# Patient Record
Sex: FEMALE | Race: WHITE | NOT HISPANIC OR LATINO | Employment: OTHER | URBAN - METROPOLITAN AREA
[De-identification: names, ages, dates, MRNs, and addresses within clinical notes are randomized per-mention and may not be internally consistent; named-entity substitution may affect disease eponyms.]

---

## 2019-10-07 ENCOUNTER — HOSPITAL ENCOUNTER (OUTPATIENT)
Dept: RADIOLOGY | Facility: HOSPITAL | Age: 44
Discharge: HOME | End: 2019-10-07
Attending: OBSTETRICS & GYNECOLOGY | Admitting: RADIOLOGY
Payer: COMMERCIAL

## 2019-10-07 VITALS — SYSTOLIC BLOOD PRESSURE: 143 MMHG | DIASTOLIC BLOOD PRESSURE: 84 MMHG

## 2019-10-07 DIAGNOSIS — Z31.41 ENCOUNTER FOR FERTILITY TESTING: ICD-10-CM

## 2019-10-07 DIAGNOSIS — N97.8 ANTISPERM ANTIBODIES CAUSING INFERTILITY: ICD-10-CM

## 2019-10-07 PROCEDURE — BU121ZZ FLUOROSCOPY OF BILATERAL FALLOPIAN TUBES USING LOW OSMOLAR CONTRAST: ICD-10-PCS | Performed by: RADIOLOGY

## 2019-10-07 PROCEDURE — 36100345 HC PROCEDURE ROOM 45MIN

## 2019-10-07 PROCEDURE — 74740 X-RAY FEMALE GENITAL TRACT: CPT

## 2019-10-07 PROCEDURE — 63600105 HC IODINE BASED CONTRAST: Mod: JW | Performed by: OBSTETRICS & GYNECOLOGY

## 2019-10-07 RX ORDER — AZITHROMYCIN 500 MG/1
1000 TABLET, FILM COATED ORAL ONCE
COMMUNITY
Start: 2019-10-05

## 2019-10-07 RX ADMIN — IOHEXOL 7 ML: 300 INJECTION, SOLUTION INTRAVENOUS at 13:20

## 2019-10-07 NOTE — DISCHARGE INSTRUCTIONS
- You may resume normal activity as tolerated  - You may resume normal diet  - Vagina spotting is common following the procedure, don't be concerned if it happens.  - If you develop heavy bleeding (like a period) please call [facility].    [facility] strongly recommends that you visit a Primary Care Provider (PCP) regularly. Your PCP can help you implement the recommendations we gave you today, coordinate care among your specialists, as well as make sure you are up to date with wellness exams, immunizations and preventive screenings. Your PCP can also help when you are feeling sick, potentially avoiding the need for urgent care or emergency department visits. For these reasons, it is important that you follow up with your PCP at least annually or more often based upon your medical conditions. If you do not have a PCP, please call [phone] ([phone]) or go to Find a Doctor for help with finding one.

## 2019-10-07 NOTE — NURSING NOTE
HCG from [facility] - 9/30/2019   HC < 1.00 mlU/mL.   Took antibiotic as ordered.

## 2019-10-07 NOTE — H&P
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 female who presents with infertility.     Medical History: No past medical history on file.    Surgical History: No past surgical history on file.    Allergies: Patient has no known allergies.    No current facility-administered medications for this encounter.        Social History:   Social History     Social History   • Marital status:      Spouse name: N/A   • Number of children: N/A   • Years of education: N/A     Social History Main Topics   • Smoking status: Not on file   • Smokeless tobacco: Not on file   • Alcohol use Not on file   • Drug use: Unknown   • Sexual activity: Not on file     Other Topics Concern   • Not on file     Social History Narrative   • No narrative on file       Family History: No family history on file.    Review of Systems  All other systems reviewed and negative except as noted in the HPI.    Objective     Vital Signs for the last 24 hours:         General appearance: alert, appears stated age, no distress and cooperative  Neurologic: Alert and oriented X 3    Labs   I have reviewed the patient's pertinent labs. Pertinent labs are within normal limits.    Imaging  Not applicable    ECG/Telemetry  not applicable    Assessment/Plan     Continue with Hysterosalpingogram    VTE Assessment: Padua      Code Status: No Order  Estimated discharge date: 10/7/2019      MOLLY Santoro

## 2019-10-07 NOTE — POST-PROCEDURE NOTE
Interventional Radiology Brief Postprocedure Note    [facility]     Attending: Migel Henao MD    Assistant: MOLLY Santoro    Diagnosis: Infertility    Description of procedure: Hysterosalpingogram    Contrast: 7ml omni 300     Anesthesia:  None    Medications: none     Complications: None      Estimated Blood Loss: Estimated Blood Loss: None    Anticoagulation: resume all medication as prescribed by PCP    Specimens: none    Findings: normal hysterosalpingogram    10/7/2019 1:22 PM

## 2024-01-28 ENCOUNTER — OFFICE VISIT (OUTPATIENT)
Dept: URGENT CARE | Facility: CLINIC | Age: 49
End: 2024-01-28
Payer: COMMERCIAL

## 2024-01-28 VITALS
HEART RATE: 65 BPM | SYSTOLIC BLOOD PRESSURE: 147 MMHG | OXYGEN SATURATION: 97 % | DIASTOLIC BLOOD PRESSURE: 91 MMHG | RESPIRATION RATE: 18 BRPM | TEMPERATURE: 98.1 F

## 2024-01-28 DIAGNOSIS — J02.9 SORE THROAT: ICD-10-CM

## 2024-01-28 DIAGNOSIS — J06.9 VIRAL URI: Primary | ICD-10-CM

## 2024-01-28 LAB — S PYO DNA THROAT QL NAA+PROBE: NOT DETECTED

## 2024-01-28 PROCEDURE — 99213 OFFICE O/P EST LOW 20 MIN: CPT

## 2024-01-28 RX ORDER — DEXTROMETHORPHAN HYDROBROMIDE AND PROMETHAZINE HYDROCHLORIDE 15; 6.25 MG/5ML; MG/5ML
5 SYRUP ORAL 4 TIMES DAILY PRN
Qty: 180 ML | Refills: 0 | Status: SHIPPED | OUTPATIENT
Start: 2024-01-28

## 2024-01-28 NOTE — PATIENT INSTRUCTIONS
Rapid strep test negative.    Your symptoms are consistent with a viral illness.    For nasal/sinus congestion you can try steam, warm compresses, saline nasal spray, Neti pot, nasal steroid (Flonase, Nasocort), or nasal decongestant (Afrin - for 3 days only).    You can try a decongestant (Sudafed) if > 6 years of age and no history of high blood pressure.    For cough you can take an over-the-counter expectorant such as plain Robitussion or Mucinex. A spoonful of honey at bedtime may also be helpful.    For sore throat you can use Cepacol lozenges, do warm salt water gargles, drink warm water with lemon or herbal teas, or use an over-the-counter throat spray (Chloraseptic).    You can take ibuprofen/Motrin and acetaminophen/Tylenol as needed for pain, fever, body aches. Do not take ibuprofen/Motrin/Advil if you have a history of heart disease, bleeding ulcers, or if you take blood thinners.     Drink plenty of fluids to stay hydrated.    Follow up with your PCP in 5-7 days for persistent symptoms.    Go to the ER if symptoms worsen.

## 2024-01-28 NOTE — PROGRESS NOTES
[facility]        NAME: Ashley Briones is a 48 y.o. female  : 1975    MRN: [id number]  DATE: 2024  TIME: 2:07 PM    Assessment and Plan   Viral URI [J06.9]  1. Viral URI  promethazine-dextromethorphan (PHENERGAN-DM) 6.25-15 mg/5 mL oral syrup      2. Sore throat  POCT rapid PCR strepA            Patient Instructions     Rapid strep test negative.    Your symptoms are consistent with a viral illness.    For nasal/sinus congestion you can try steam, warm compresses, saline nasal spray, Neti pot, nasal steroid (Flonase, Nasocort), or nasal decongestant (Afrin - for 3 days only).    You can try a decongestant (Sudafed) if > 6 years of age and no history of high blood pressure.    For cough you can take an over-the-counter expectorant such as plain Robitussion or Mucinex. A spoonful of honey at bedtime may also be helpful.    For sore throat you can use Cepacol lozenges, do warm salt water gargles, drink warm water with lemon or herbal teas, or use an over-the-counter throat spray (Chloraseptic).    You can take ibuprofen/Motrin and acetaminophen/Tylenol as needed for pain, fever, body aches. Do not take ibuprofen/Motrin/Advil if you have a history of heart disease, bleeding ulcers, or if you take blood thinners.     Drink plenty of fluids to stay hydrated.    Follow up with your PCP in 5-7 days for persistent symptoms.    Go to the ER if symptoms worsen.        Chief Complaint     Chief Complaint   Patient presents with    Sore Throat     Pt reports sore throat since Tuesday. Pt recovered from having fevers and joint pain. Pt reports     Cough     Pt reports sore throat causes small dry cough.          History of Present Illness       This is a 48-year-old female presenting with a sore throat x5 days. Experienced subjective fevers, chills, and body aches at onset of illness which have since resolved. Now with PND and a cough which keep her up at night. Taking various OTC supplements  and drinking teas for relief.        Review of Systems   Review of Systems   Constitutional:  Positive for chills and fever (subjective, resolved).   HENT:  Positive for postnasal drip and sore throat. Negative for congestion, ear pain, rhinorrhea and sinus pressure.    Eyes:  Negative for discharge and redness.   Respiratory:  Positive for cough. Negative for shortness of breath and wheezing.    Cardiovascular:  Negative for chest pain and palpitations.   Gastrointestinal:  Negative for abdominal pain, diarrhea, nausea and vomiting.   Musculoskeletal:  Positive for arthralgias and myalgias.   Skin:  Negative for pallor and rash.   Neurological:  Negative for dizziness, light-headedness and headaches.         Current Medications       Current Outpatient Medications:     promethazine-dextromethorphan (PHENERGAN-DM) 6.25-15 mg/5 mL oral syrup, Take 5 mL by mouth 4 (four) times a day as needed for cough, Disp: 180 mL, Rfl: 0    Current Allergies     Allergies as of 01/28/2024 - Reviewed 01/28/2024   Allergen Reaction Noted    Nitrofurantoin Rash 01/28/2024            The following portions of the patient's history were reviewed and updated as appropriate: allergies, current medications, past family history, past medical history, past social history, past surgical history and problem list.     History reviewed. No pertinent past medical history.    History reviewed. No pertinent surgical history.    History reviewed. No pertinent family history.      Medications have been verified.        Objective   /91   Pulse 65   Temp 98.1 °F (36.7 °C) (Tympanic)   Resp 18   SpO2 97%        Physical Exam     Physical Exam  Vitals and nursing note reviewed.   Constitutional:       General: She is not in acute distress.     Appearance: She is not ill-appearing or diaphoretic.   HENT:      Head: Normocephalic.      Right Ear: Tympanic membrane, ear canal and external ear normal.      Left Ear: Tympanic membrane, ear canal and  external ear normal.      Nose: Nose normal.      Mouth/Throat:      Mouth: Mucous membranes are moist.      Pharynx: Posterior oropharyngeal erythema (mild) present.      Comments: PND  Eyes:      Conjunctiva/sclera: Conjunctivae normal.      Pupils: Pupils are equal, round, and reactive to light.   Cardiovascular:      Rate and Rhythm: Normal rate and regular rhythm.      Pulses: Normal pulses.      Heart sounds: Normal heart sounds.   Pulmonary:      Effort: Pulmonary effort is normal.      Breath sounds: Normal breath sounds.      Comments: Strong raspy cough noted.  Musculoskeletal:         General: Normal range of motion.      Cervical back: Normal range of motion and neck supple.   Lymphadenopathy:      Cervical: No cervical adenopathy.   Skin:     General: Skin is warm and dry.      Capillary Refill: Capillary refill takes less than 2 seconds.   Neurological:      Mental Status: She is alert and oriented to person, place, and time.

## 2024-06-26 ENCOUNTER — OFFICE VISIT (OUTPATIENT)
Dept: URGENT CARE | Facility: CLINIC | Age: 49
End: 2024-06-26
Payer: COMMERCIAL

## 2024-06-26 ENCOUNTER — APPOINTMENT (OUTPATIENT)
Dept: RADIOLOGY | Facility: CLINIC | Age: 49
End: 2024-06-26
Payer: COMMERCIAL

## 2024-06-26 VITALS
HEART RATE: 74 BPM | RESPIRATION RATE: 18 BRPM | DIASTOLIC BLOOD PRESSURE: 102 MMHG | OXYGEN SATURATION: 98 % | SYSTOLIC BLOOD PRESSURE: 175 MMHG

## 2024-06-26 DIAGNOSIS — M25.512 ACUTE PAIN OF LEFT SHOULDER: Primary | ICD-10-CM

## 2024-06-26 DIAGNOSIS — M75.02 ADHESIVE CAPSULITIS OF LEFT SHOULDER: ICD-10-CM

## 2024-06-26 PROCEDURE — 99213 OFFICE O/P EST LOW 20 MIN: CPT | Performed by: FAMILY MEDICINE

## 2024-06-26 PROCEDURE — 73030 X-RAY EXAM OF SHOULDER: CPT

## 2024-06-26 NOTE — PROGRESS NOTES
[facility]        NAME: Ashley Briones is a 48 y.o. female  : 1975    MRN: [id number]  DATE: 2024  TIME: 1:56 PM    Assessment and Plan   Acute pain of left shoulder [M25.512]  1. Acute pain of left shoulder  XR shoulder 2+ vw left      2. Adhesive capsulitis of left shoulder              Patient Instructions       Follow up with PCP in 3-5 days.  Proceed to  ER if symptoms worsen.    If tests have been performed at [facility], our office will contact you with results if changes need to be made to the care plan discussed with you at the visit.  [facility]    Chief Complaint     Chief Complaint   Patient presents with    Shoulder Pain     Patient has left shoulder pain that started on Monday evening. States the only thing she did that could have aggravated it was a heavier arm workout at the gym on           History of Present Illness       48-year-old female presenting for evaluation of left shoulder pain.  She reports began with pain 3 days ago after doing an intense exercise program at the gym while weight training.  She states that she is unable to move her arm above her head or out to the side.  Ibuprofen has provided minimal relief.  Denies any injuries or trauma.  Denies any joint redness, warmth or swelling.    Shoulder Pain         Review of Systems   Review of Systems   Constitutional: Negative.    HENT: Negative.     Eyes: Negative.    Respiratory: Negative.     Cardiovascular: Negative.    Gastrointestinal: Negative.    Genitourinary: Negative.    Musculoskeletal:  Positive for arthralgias and myalgias.   Skin: Negative.    Allergic/Immunologic: Negative.    Neurological: Negative.    Hematological: Negative.    Psychiatric/Behavioral: Negative.           Current Medications       Current Outpatient Medications:     promethazine-dextromethorphan (PHENERGAN-DM) 6.25-15 mg/5 mL oral syrup, Take 5 mL by mouth 4 (four) times a  day as needed for cough, Disp: 180 mL, Rfl: 0    Current Allergies     Allergies as of 06/26/2024 - Reviewed 01/28/2024   Allergen Reaction Noted    Nitrofurantoin Rash 01/28/2024            The following portions of the patient's history were reviewed and updated as appropriate: allergies, current medications, past family history, past medical history, past social history, past surgical history and problem list.     No past medical history on file.    No past surgical history on file.    No family history on file.      Medications have been verified.        Objective   BP (!) 175/102   Pulse 74   Resp 18   SpO2 98%   No LMP recorded.       Physical Exam     Physical Exam  Vitals and nursing note reviewed.   Constitutional:       Appearance: She is well-developed.   HENT:      Head: Normocephalic.      Nose: Nose normal.   Eyes:      Pupils: Pupils are equal, round, and reactive to light.   Cardiovascular:      Rate and Rhythm: Normal rate.   Pulmonary:      Effort: Pulmonary effort is normal.   Abdominal:      General: Abdomen is flat.   Musculoskeletal:         General: Tenderness present. No signs of injury.      Left shoulder: Tenderness present. No effusion. Decreased range of motion.      Cervical back: Normal range of motion.   Skin:     General: Skin is warm and dry.   Neurological:      Mental Status: She is alert and oriented to person, place, and time.